# Patient Record
Sex: MALE | Race: BLACK OR AFRICAN AMERICAN | NOT HISPANIC OR LATINO | Employment: FULL TIME | URBAN - METROPOLITAN AREA
[De-identification: names, ages, dates, MRNs, and addresses within clinical notes are randomized per-mention and may not be internally consistent; named-entity substitution may affect disease eponyms.]

---

## 2017-01-03 ENCOUNTER — MYC MEDICAL ADVICE (OUTPATIENT)
Dept: RHEUMATOLOGY | Facility: CLINIC | Age: 27
End: 2017-01-03

## 2017-01-03 ENCOUNTER — TELEPHONE (OUTPATIENT)
Dept: RHEUMATOLOGY | Facility: CLINIC | Age: 27
End: 2017-01-03

## 2017-01-03 NOTE — TELEPHONE ENCOUNTER
Patient calling. He was seen last week. He took the weaker of the medications offered. He would like to try the stronger one. Please call and let know.

## 2017-01-03 NOTE — TELEPHONE ENCOUNTER
If he is having an attack ---none of the meds work right away---probenecid nor allopurinol.  For that, he would need indocin or prednisone.  Otherwise, we can switch to allopurinol 300 mg daily

## 2017-01-03 NOTE — TELEPHONE ENCOUNTER
Tried to call pt. And there was no answer or voice mail.  Will mychart a message as well.      Marla LADD LPN

## 2017-02-21 NOTE — TELEPHONE ENCOUNTER
Reason for call: Patient is calling back regarding missed calls and trying different medication.    Phone Number Pt can be reached at: Home number on file [phone] (home)  Best Time: AVAILABLE AFTER 6 PM.  Can we leave a detailed message on this number? YES  - HE IS WORKING ON SETTING UP VM

## 2017-07-05 ENCOUNTER — OFFICE VISIT (OUTPATIENT)
Dept: FAMILY MEDICINE | Facility: CLINIC | Age: 27
End: 2017-07-05
Payer: COMMERCIAL

## 2017-07-05 VITALS
BODY MASS INDEX: 35.49 KG/M2 | HEIGHT: 72 IN | HEART RATE: 65 BPM | TEMPERATURE: 98.4 F | SYSTOLIC BLOOD PRESSURE: 138 MMHG | WEIGHT: 262 LBS | DIASTOLIC BLOOD PRESSURE: 86 MMHG

## 2017-07-05 DIAGNOSIS — R09.81 NASAL CONGESTION: ICD-10-CM

## 2017-07-05 DIAGNOSIS — F41.9 ANXIETY AND DEPRESSION: Primary | ICD-10-CM

## 2017-07-05 DIAGNOSIS — F32.A ANXIETY AND DEPRESSION: Primary | ICD-10-CM

## 2017-07-05 LAB — TSH SERPL DL<=0.005 MIU/L-ACNC: 1.29 MU/L (ref 0.4–4)

## 2017-07-05 PROCEDURE — 99214 OFFICE O/P EST MOD 30 MIN: CPT | Performed by: FAMILY MEDICINE

## 2017-07-05 PROCEDURE — 84443 ASSAY THYROID STIM HORMONE: CPT | Performed by: FAMILY MEDICINE

## 2017-07-05 PROCEDURE — 36415 COLL VENOUS BLD VENIPUNCTURE: CPT | Performed by: FAMILY MEDICINE

## 2017-07-05 RX ORDER — OXYMETAZOLINE HYDROCHLORIDE 0.05 G/100ML
2 SPRAY NASAL 2 TIMES DAILY
Qty: 1.2 ML | Refills: 0 | Status: SHIPPED | OUTPATIENT
Start: 2017-07-05 | End: 2017-07-08

## 2017-07-05 ASSESSMENT — ANXIETY QUESTIONNAIRES
5. BEING SO RESTLESS THAT IT IS HARD TO SIT STILL: NEARLY EVERY DAY
3. WORRYING TOO MUCH ABOUT DIFFERENT THINGS: NEARLY EVERY DAY
1. FEELING NERVOUS, ANXIOUS, OR ON EDGE: NEARLY EVERY DAY
7. FEELING AFRAID AS IF SOMETHING AWFUL MIGHT HAPPEN: NEARLY EVERY DAY
2. NOT BEING ABLE TO STOP OR CONTROL WORRYING: NEARLY EVERY DAY
6. BECOMING EASILY ANNOYED OR IRRITABLE: NEARLY EVERY DAY
GAD7 TOTAL SCORE: 21

## 2017-07-05 ASSESSMENT — PATIENT HEALTH QUESTIONNAIRE - PHQ9: 5. POOR APPETITE OR OVEREATING: NEARLY EVERY DAY

## 2017-07-05 NOTE — NURSING NOTE
Chief Complaint   Patient presents with     Sinus Problem     runny and stuffy nose x 2 days      Depression     and anxiety        Initial /86  Pulse 65  Temp 98.4  F (36.9  C) (Oral)  Ht 6' (1.829 m)  Wt 262 lb (118.8 kg)  BMI 35.53 kg/m2 Estimated body mass index is 35.53 kg/(m^2) as calculated from the following:    Height as of this encounter: 6' (1.829 m).    Weight as of this encounter: 262 lb (118.8 kg).  Medication Reconciliation: complete  Cecilia Barber MA

## 2017-07-05 NOTE — LETTER
[address] [address]  Phone: [phone]  Fax: [phone]    July 5, 2017        Vitor Oseguera  [address] [facility] [address]          To whom it may concern:    RE: Vitor Oseguera    Patient was seen and treated today at our clinic.    Please contact me for questions or concerns.      Sincerely,        Veronica Pederson MD

## 2017-07-05 NOTE — PROGRESS NOTES
SUBJECTIVE:                                                    Vitor Oseguera is a 27 year old male who presents to clinic today for the following health issues:    ENT Symptoms             Symptoms: cc Present Absent Comment   Fever/Chills  x  Feels warm to touch.    Fatigue  x     Muscle Aches  x  Neck and shoulders    Eye Irritation   x    Sneezing  x  Feels like he has seasonal allergies. This is new this year.    Nasal Nicolas/Drg  x     Sinus Pressure/Pain  x     Loss of smell   x    Dental pain   x    Sore Throat   x    Swollen Glands   x    Ear Pain/Fullness  x  R ear    Cough  x     Wheeze  x     Chest Pain   x    Shortness of breath  x     Rash   x    Other         Symptom duration:  x 2 days    Symptom severity:  moderate   Treatments tried:  mucinex    Contacts:  none       Abnormal Mood Symptoms  Onset: 3-5 year ago     Description:   Depression: YES  Anxiety: YES    Accompanying Signs & Symptoms:  Still participating in activities that you used to enjoy: no  Fatigue: YES  Irritability: YES  Difficulty concentrating: YES  Changes in appetite: YES  Problems with sleep: YES  Heart racing/beating fast : YES  Thoughts of hurting yourself or others: none    History:   Recent stress: YES  Prior depression hospitalization: None  Family history of depression: YES  Family history of anxiety: YES    Precipitating factors:   Alcohol/drug use: no     Alleviating factors:  None   Therapies Tried and outcome: None    His symptoms are getting worse recently. It is hard to handle. Starting to affect work and relationship. No Suicidal or homicidal thoughts or ideations.   Has support from family and friends.   Nonsmoker.   Occasional alcohol drinking.   No other drug use.       Problem list and histories reviewed & adjusted, as indicated.  Additional history: as documented    Patient Active Problem List   Diagnosis     CARDIOVASCULAR SCREENING; LDL GOAL LESS THAN 160     Past Surgical History:   Procedure Laterality Date      NO HISTORY OF SURGERY         Social History   Substance Use Topics     Smoking status: Former Smoker     Smokeless tobacco: Never Used     Alcohol use No      Comment: rare     Family History   Problem Relation Age of Onset     Hypertension Mother      DIABETES Father      Hypertension Father          BP Readings from Last 3 Encounters:   07/05/17 138/86   12/20/16 147/85   12/07/16 124/70    Wt Readings from Last 3 Encounters:   07/05/17 262 lb (118.8 kg)   12/20/16 246 lb (111.6 kg)   12/07/16 260 lb (117.9 kg)                    Reviewed and updated as needed this visit by clinical staff  Tobacco  Allergies  Meds  Med Hx  Surg Hx  Fam Hx  Soc Hx      Reviewed and updated as needed this visit by Provider         ROS:  Constitutional, HEENT, cardiovascular, pulmonary, gi and gu systems are negative, except as otherwise noted.    OBJECTIVE:     /86  Pulse 65  Temp 98.4  F (36.9  C) (Oral)  Ht 6' (1.829 m)  Wt 262 lb (118.8 kg)  BMI 35.53 kg/m2  Body mass index is 35.53 kg/(m^2).  GENERAL: healthy, alert and no distress  HENT: ear canals and TM's normal, nose: nasal congestion bilateral nostrils and mouth without ulcers or lesions  NECK: no adenopathy, no asymmetry, masses, or scars and thyroid normal to palpation  SINUSES: nontender.   RESP: lungs clear to auscultation - no rales, rhonchi or wheezes  CV: regular rate and rhythm  MS: no gross musculoskeletal defects noted, no edema  PSYCH: mentation appears normal, affect normal/bright    ASSESSMENT/PLAN:       ICD-10-CM    1. Anxiety and depression F41.9 sertraline (ZOLOFT) 50 MG tablet    F32.9 TSH with free T4 reflex     MENTAL HEALTH REFERRAL   2. Nasal congestion R09.81 oxymetazoline (AFRIN NASAL SPRAY) 0.05 % spray     Pt aware that it takes 4-6 weeks for the medication to be effective.   Symptoms may get worse at the beginning.   Psychotherapy referral.   F/u in 6 weeks.     URI symptoms: afrin nasal spray, OTC medications for pain and  fever. OTC meds for allergies as needed. F/u if not better by a week.       Veronica Pederson MD  [facility]

## 2017-07-05 NOTE — MR AVS SNAPSHOT
After Visit Summary   7/5/2017    Vitor Oseguera    MRN: [id number]           Patient Information     Date Of Birth          1990        Visit Information        Provider Department      7/5/2017 2:00 PM Veronica Pederson MD [facility]        Today's Diagnoses     Anxiety and depression    -  1    Nasal congestion          Care Instructions      Preventive Health Recommendations  Male Ages 18 - 25     Yearly exam:             See your health care provider every year in order to  o   Review health changes.   o   Discuss preventive care.    o   Review your medicines if your doctor has prescribed any.    You should be tested each year for STDs (sexually transmitted diseases).     Talk to your provider about cholesterol testing.      If you are at risk for diabetes, you should have a diabetes test (fasting glucose).    Shots: Get a flu shot each year. Get a tetanus shot every 10 years.     Nutrition:    Eat at least 5 servings of fruits and vegetables daily.     Eat whole-grain bread, whole-wheat pasta and brown rice instead of white grains and rice.     Talk to your provider about calcium and Vitamin D.     Lifestyle    Exercise for at least 150 minutes a week (30 minutes a day, 5 days a week). This will help you control your weight and prevent disease.     Limit alcohol to one drink per day.     No smoking.     Wear sunscreen to prevent skin cancer.     See your dentist every six months for an exam and cleaning.             Follow-ups after your visit        Additional Services     MENTAL HEALTH REFERRAL       Your provider has referred you to: [facility]: [facility] - Counseling (Individual/Couples/Family) - [facility] [phone]   [email or web address]   *Patient will be contacted by [facility] scheduling partner[facility]), to schedule an appointment.   Patients may also call [facility] to schedule.    All scheduling is subject to the client's specific insurance plan & benefits, provider/location availability, and provider clinical specialities.  Please arrive 15 minutes early for your first appointment and bring your completed paperwork.    Please be aware that coverage of these services is subject to the terms and limitations of your health insurance plan.  Call member services at your health plan with any benefit or coverage questions.                  Who to contact     If you have questions or need follow up information about today's clinic visit or your schedule please contact [facility] directly at [phone].  Normal or non-critical lab and imaging results will be communicated to you by Dazohart, letter or phone within 4 business days after the clinic has received the results. If you do not hear from us within 7 days, please contact the clinic through Blipifyt or phone. If you have a critical or abnormal lab result, we will notify you by phone as soon as possible.  Submit refill requests through Lifesum or call your pharmacy and they will forward the refill request to us. Please allow 3 business days for your refill to be completed.          Additional Information About Your Visit        MyChart Information     Lifesum gives you secure access to your electronic health record. If you see a primary care provider, you can also send messages to your care team and make appointments. If you have questions, please call your primary care clinic.  If you do not have a primary care provider, please call [phone] and they will assist you.        Care EveryWhere ID     This is your Care EveryWhere ID. This could be used by other organizations to access your [facility] medical records  [id number]        Your Vitals Were     Pulse Temperature Height BMI (Body Mass Index)          65 98.4  F (36.9  C) (Oral) 6' (1.829 m) 35.53 kg/m2         Blood  Pressure from Last 3 Encounters:   07/05/17 138/86   12/20/16 147/85   12/07/16 124/70    Weight from Last 3 Encounters:   07/05/17 262 lb (118.8 kg)   12/20/16 246 lb (111.6 kg)   12/07/16 260 lb (117.9 kg)              We Performed the Following     MENTAL HEALTH REFERRAL     TSH with free T4 reflex          Today's Medication Changes          These changes are accurate as of: 7/5/17  2:52 PM.  If you have any questions, ask your nurse or doctor.               Start taking these medicines.        Dose/Directions    oxymetazoline 0.05 % spray   Commonly known as:  AFRIN NASAL SPRAY   Used for:  Nasal congestion   Started by:  Veronica Pederson MD        Dose:  2 spray   Spray 2 sprays into both nostrils 2 times daily for 3 days   Quantity:  1.2 mL   Refills:  0       sertraline 50 MG tablet   Commonly known as:  ZOLOFT   Used for:  Anxiety and depression   Started by:  Veronica Pederson MD        Take 1/2 tablet (25 mg) for 2 weeks, then increase to 1 tablet orally daily   Quantity:  30 tablet   Refills:  1            Where to get your medicines      These medications were sent to [facility] [address]  [address] [facility] [address]     Phone:  [phone]     oxymetazoline 0.05 % spray    sertraline 50 MG tablet                Primary Care Provider Office Phone # Fax #    Jaymie Blount PA-C [phone]       [facility] [address] [facility] [address]        Equal Access to Services     JOSUE [facility] : Hadii edson ku hadasho Soomaali, waaxda luqadaha, qaybta kaalmada adeegally, huber idiin hayaan adeeg kharash la'aan . So [facility] [phone].    ATENCIÓN: Si habla español, tiene a schafer disposición servicios gratuitos de asistencia lingüística. Llame al [phone].    We comply with applicable federal civil rights laws and [place] laws. We do not discriminate on the basis of race, color,  national origin, age, disability sex, sexual orientation or gender identity.            Thank you!     Thank you for choosing [facility]  for your care. Our goal is always to provide you with excellent care. Hearing back from our patients is one way we can continue to improve our services. Please take a few minutes to complete the written survey that you may receive in the mail after your visit with us. Thank you!             Your Updated Medication List - Protect others around you: Learn how to safely use, store and throw away your medicines at www.disposemymeds.org.          This list is accurate as of: 7/5/17  2:52 PM.  Always use your most recent med list.                   Brand Name Dispense Instructions for use Diagnosis    oxymetazoline 0.05 % spray    AFRIN NASAL SPRAY    1.2 mL    Spray 2 sprays into both nostrils 2 times daily for 3 days    Nasal congestion       sertraline 50 MG tablet    ZOLOFT    30 tablet    Take 1/2 tablet (25 mg) for 2 weeks, then increase to 1 tablet orally daily    Anxiety and depression

## 2017-07-06 ASSESSMENT — PATIENT HEALTH QUESTIONNAIRE - PHQ9: SUM OF ALL RESPONSES TO PHQ QUESTIONS 1-9: 24

## 2017-07-06 ASSESSMENT — ANXIETY QUESTIONNAIRES: GAD7 TOTAL SCORE: 21

## 2017-07-06 NOTE — PROGRESS NOTES
Dear Vitor Oseguera,     Your recent TSH ( thyroid test ) is normal.     Veronica Pederson MD.   Family Physician.  [facility].

## 2017-08-15 PROBLEM — F32.A ANXIETY AND DEPRESSION: Status: ACTIVE | Noted: 2017-08-15

## 2017-08-15 PROBLEM — F41.9 ANXIETY AND DEPRESSION: Status: ACTIVE | Noted: 2017-08-15

## 2017-10-17 ENCOUNTER — TELEPHONE (OUTPATIENT)
Dept: FAMILY MEDICINE | Facility: CLINIC | Age: 27
End: 2017-10-17

## 2017-10-17 DIAGNOSIS — F41.9 ANXIETY AND DEPRESSION: ICD-10-CM

## 2017-10-17 DIAGNOSIS — F32.A ANXIETY AND DEPRESSION: ICD-10-CM

## 2017-10-17 NOTE — TELEPHONE ENCOUNTER
Routing refill request to provider for review/approval because:  Elevated PHQ-9 score > 5      Clair Hernández RN  [facility]

## 2017-10-17 NOTE — TELEPHONE ENCOUNTER
sertraline (ZOLOFT) 50 MG tablet     Last Written Prescription Date: 7/5/17  Last Fill Quantity: 30, # refills: 1  Last Office Visit with G primary care provider:  7/5/17        Last PHQ-9 score on record=   PHQ-9 SCORE 7/5/2017   Total Score 24

## 2017-10-17 NOTE — LETTER
Vitor,    We refilled your Sertraline for you.  Please call us at [phone] so we may update your depression screening, and see how you are doing on this medication.    You may also make an office visit with Dr. Pederson for a follow up.    Jeanie Urbano RN [facility] Triage.

## 2017-10-19 NOTE — TELEPHONE ENCOUNTER
PHQ-9 score:    PHQ-9 SCORE 7/5/2017   Total Score 24       Patient last seen 7/5/17, see plan:    Pt aware that it takes 4-6 weeks for the medication to be effective.   Symptoms may get worse at the beginning.   Psychotherapy referral.   F/u in 6 weeks.         Attempted to call patient at home number [phone], left message on answering service requesting call back to clinic to discuss.  Rachelle Hernandez RN  [facility]

## 2017-10-20 NOTE — TELEPHONE ENCOUNTER
Called patient at [phone] (home). Left message on voicemail to return phone call to triage.  Jeanie Urbano RN [facility] Triage.

## 2017-10-24 NOTE — TELEPHONE ENCOUNTER
Patient has not returned phone call.  Mailed letter to patient with the information below per provider.  See letter.  Jeanie Urbano RN [facility] Triage.

## 2018-02-19 ENCOUNTER — TELEPHONE (OUTPATIENT)
Dept: FAMILY MEDICINE | Facility: CLINIC | Age: 28
End: 2018-02-19

## 2018-02-19 DIAGNOSIS — F32.A ANXIETY AND DEPRESSION: ICD-10-CM

## 2018-02-19 DIAGNOSIS — F41.9 ANXIETY AND DEPRESSION: ICD-10-CM

## 2018-02-19 NOTE — TELEPHONE ENCOUNTER
"Requested Prescriptions   Pending Prescriptions Disp Refills     sertraline (ZOLOFT) 50 MG tablet [Pharmacy Med Name: SERTRALINE HCL 50MG TABS] 90 tablet 0    Last Written Prescription Date:  10-18-17  Last Fill Quantity: 90,  # refills: 0   Last office visit: 7/5/2017 with prescribing provider:     Future Office Visit:     Sig: TAKE ONE TABLET BY MOUTH EVERY DAY    SSRIs Protocol Passed    2/19/2018  1:17 PM       Passed - Recent or future visit with authorizing provider    Patient had office visit in the last year or has a visit in the next 30 days with authorizing provider.  See \"Patient Info\" tab in inbasket, or \"Choose Columns\" in Meds & Orders section of the refill encounter.            Passed - Patient is age 18 or older          "

## 2018-02-19 NOTE — TELEPHONE ENCOUNTER
30 pills approved. Get PHQ 9 done over phone or   Pt needs to be seen before next refill.     Veronica Pederson MD.   Family Physician.  [facility].

## 2018-02-19 NOTE — TELEPHONE ENCOUNTER
PHQ-9 score:    PHQ-9 SCORE 7/5/2017   Total Score 24     Routing refill request to provider for review/approval because:  PHQ 9 = 24    Jeanie Urbano, COLLIN [facility] Triage.

## 2018-03-08 NOTE — TELEPHONE ENCOUNTER
Left Voicemail for patient to call back to inform him before next refill that he is due for appointment.  Jimena Villela, CMA

## 2018-03-26 DIAGNOSIS — F32.A ANXIETY AND DEPRESSION: ICD-10-CM

## 2018-03-26 DIAGNOSIS — F41.9 ANXIETY AND DEPRESSION: ICD-10-CM

## 2018-03-26 NOTE — TELEPHONE ENCOUNTER
"Requested Prescriptions   Pending Prescriptions Disp Refills     sertraline (ZOLOFT) 50 MG tablet [Pharmacy Med Name: SERTRALINE HCL 50MG TABS] 30 tablet 0    Last Written Prescription Date:  2-19-18  Last Fill Quantity: 30,  # refills: 0   Last office visit: 7/5/2017 with prescribing provider:     Future Office Visit:     Sig: TAKE ONE TABLET BY MOUTH EVERY DAY    SSRIs Protocol Passed    3/26/2018  3:08 PM       Passed - Recent (12 mo) or future (30 days) visit within the authorizing provider's specialty    Patient had office visit in the last 12 months or has a visit in the next 30 days with authorizing provider or within the authorizing provider's specialty.  See \"Patient Info\" tab in inbasket, or \"Choose Columns\" in Meds & Orders section of the refill encounter.           Passed - Patient is age 18 or older          "

## 2018-03-28 NOTE — TELEPHONE ENCOUNTER
One adelnie already given back on 2/19/18.  Left another  for patient to call and schedule a follow-up appointment.      Courtney Reyez RN

## 2018-03-28 NOTE — TELEPHONE ENCOUNTER
Tried to contact patient to inform him before his next refill that he is due for an appointment.  Have left patient 3 voice mails.    Jimena Villela, JU

## 2019-10-01 ENCOUNTER — HEALTH MAINTENANCE LETTER (OUTPATIENT)
Age: 29
End: 2019-10-01

## 2021-01-15 ENCOUNTER — HEALTH MAINTENANCE LETTER (OUTPATIENT)
Age: 31
End: 2021-01-15

## 2021-01-25 ASSESSMENT — ENCOUNTER SYMPTOMS
HEMATOCHEZIA: 0
ARTHRALGIAS: 1
DIZZINESS: 0
JOINT SWELLING: 0
CONSTIPATION: 0
SHORTNESS OF BREATH: 0
DYSURIA: 0
NERVOUS/ANXIOUS: 1
PALPITATIONS: 0
FREQUENCY: 0
WEAKNESS: 0
ABDOMINAL PAIN: 0
FEVER: 0
NAUSEA: 0
CHILLS: 0
HEADACHES: 0
PARESTHESIAS: 0
HEMATURIA: 0
COUGH: 0
MYALGIAS: 0
HEARTBURN: 0
SORE THROAT: 0
EYE PAIN: 0
DIARRHEA: 0

## 2021-01-26 ENCOUNTER — OFFICE VISIT (OUTPATIENT)
Dept: FAMILY MEDICINE | Facility: CLINIC | Age: 31
End: 2021-01-26
Payer: MEDICAID

## 2021-01-26 VITALS
WEIGHT: 263 LBS | TEMPERATURE: 98.4 F | DIASTOLIC BLOOD PRESSURE: 82 MMHG | HEART RATE: 87 BPM | OXYGEN SATURATION: 96 % | HEIGHT: 73 IN | SYSTOLIC BLOOD PRESSURE: 130 MMHG | BODY MASS INDEX: 34.85 KG/M2

## 2021-01-26 DIAGNOSIS — E66.09 CLASS 1 OBESITY DUE TO EXCESS CALORIES WITHOUT SERIOUS COMORBIDITY WITH BODY MASS INDEX (BMI) OF 34.0 TO 34.9 IN ADULT: ICD-10-CM

## 2021-01-26 DIAGNOSIS — F32.A ANXIETY AND DEPRESSION: ICD-10-CM

## 2021-01-26 DIAGNOSIS — E66.811 CLASS 1 OBESITY DUE TO EXCESS CALORIES WITHOUT SERIOUS COMORBIDITY WITH BODY MASS INDEX (BMI) OF 34.0 TO 34.9 IN ADULT: ICD-10-CM

## 2021-01-26 DIAGNOSIS — F41.9 ANXIETY AND DEPRESSION: ICD-10-CM

## 2021-01-26 DIAGNOSIS — Z00.00 ROUTINE GENERAL MEDICAL EXAMINATION AT A HEALTH CARE FACILITY: Primary | ICD-10-CM

## 2021-01-26 PROCEDURE — 99395 PREV VISIT EST AGE 18-39: CPT | Performed by: PHYSICIAN ASSISTANT

## 2021-01-26 ASSESSMENT — ENCOUNTER SYMPTOMS
NAUSEA: 0
MYALGIAS: 0
FREQUENCY: 0
DYSURIA: 0
CHILLS: 0
EYE PAIN: 0
DIZZINESS: 0
ARTHRALGIAS: 1
WEAKNESS: 0
HEADACHES: 0
JOINT SWELLING: 0
ABDOMINAL PAIN: 0
SHORTNESS OF BREATH: 0
PARESTHESIAS: 0
HEMATURIA: 0
COUGH: 0
HEMATOCHEZIA: 0
HEARTBURN: 0
SORE THROAT: 0
NERVOUS/ANXIOUS: 1
PALPITATIONS: 0
FEVER: 0
DIARRHEA: 0
CONSTIPATION: 0

## 2021-01-26 ASSESSMENT — MIFFLIN-ST. JEOR: SCORE: 2215.45

## 2021-01-26 ASSESSMENT — PATIENT HEALTH QUESTIONNAIRE - PHQ9: SUM OF ALL RESPONSES TO PHQ QUESTIONS 1-9: 10

## 2021-01-26 NOTE — PROGRESS NOTES
SUBJECTIVE:   CC: Vitor Oseguera is an 30 year old male who presents for preventative health visit.       Patient has been advised of split billing requirements and indicates understanding: Yes  Healthy Habits:     Getting at least 3 servings of Calcium per day:  Yes    Bi-annual eye exam:  NO    Dental care twice a year:  NO    Sleep apnea or symptoms of sleep apnea:  None    Diet:  Regular (no restrictions)    Frequency of exercise:  1 day/week    Duration of exercise:  15-30 minutes    Taking medications regularly:  Yes    PHQ-2 Total Score: 2    Additional concerns today:  Yes      Has gained weight recently.   Family history of HTN  Declines STD testing.   Has been a rough week or 2. Overall feeling like the zoloft 50mg is working well.   He does not know who is prescribing this for him, but he recently had this refilled.     He notes his paternal uncle and cousin both have the same type of cancer, uncle is . He does not know the name of the cancer or the type.           Today's PHQ-2 Score:   PHQ-2 (  [employer]) 2021   Q1: Little interest or pleasure in doing things 1   Q2: Feeling down, depressed or hopeless 1   PHQ-2 Score 2   Q1: Little interest or pleasure in doing things Several days   Q2: Feeling down, depressed or hopeless Several days   PHQ-2 Score 2       Abuse: Current or Past(Physical, Sexual or Emotional)- No  Do you feel safe in your environment? Yes        Social History     Tobacco Use     Smoking status: Former Smoker     Packs/day: 0.00     Years: 5.00     Pack years: 0.00     Smokeless tobacco: Never Used     Tobacco comment: Smoked from 1823-2057 on and off   Substance Use Topics     Alcohol use: No     Comment: rare     If you drink alcohol do you typically have >3 drinks per day or >7 drinks per week? No    Alcohol Use 2021   Prescreen: >3 drinks/day or >7 drinks/week? -   Prescreen: >3 drinks/day or >7 drinks/week? No       Last PSA: No results found for:  "PSA    Reviewed orders with patient. Reviewed health maintenance and updated orders accordingly - Yes    Reviewed and updated as needed this visit by clinical staff  Tobacco  Allergies  Meds  Problems  Med Hx  Surg Hx  Fam Hx          Reviewed and updated as needed this visit by Provider  Tobacco  Allergies  Meds  Problems  Med Hx  Surg Hx  Fam Hx             Review of Systems   Constitutional: Negative for chills and fever.   HENT: Negative for congestion, ear pain, hearing loss and sore throat.    Eyes: Negative for pain and visual disturbance.   Respiratory: Negative for cough and shortness of breath.    Cardiovascular: Negative for chest pain, palpitations and peripheral edema.   Gastrointestinal: Negative for abdominal pain, constipation, diarrhea, heartburn, hematochezia and nausea.   Genitourinary: Negative for discharge, dysuria, frequency, genital sores, hematuria, impotence and urgency.   Musculoskeletal: Positive for arthralgias. Negative for joint swelling and myalgias.   Skin: Negative for rash.   Neurological: Negative for dizziness, weakness, headaches and paresthesias.   Psychiatric/Behavioral: Positive for mood changes. The patient is nervous/anxious.        OBJECTIVE:   BP (!) 144/95 (BP Location: Left arm, Patient Position: Chair, Cuff Size: Adult Large)   Pulse 87   Temp 98.4  F (36.9  C) (Oral)   Ht 1.852 m (6' 0.91\")   Wt 119.3 kg (263 lb)   SpO2 96%   BMI 34.78 kg/m      Physical Exam  GENERAL: healthy, alert and no distress  EYES: Eyes grossly normal to inspection, PERRL and conjunctivae and sclerae normal  HENT: ear canals and TM's normal, nose and mouth without ulcers or lesions  NECK: no adenopathy, no asymmetry, masses, or scars and thyroid normal to palpation  RESP: lungs clear to auscultation - no rales, rhonchi or wheezes  CV: regular rate and rhythm, normal S1 S2, no S3 or S4, no murmur, click or rub, no peripheral edema and peripheral pulses strong  ABDOMEN: soft, " "nontender, no hepatosplenomegaly, no masses and bowel sounds normal  MS: no gross musculoskeletal defects noted, no edema  SKIN: no suspicious lesions or rashes  NEURO: Normal strength and tone, mentation intact and speech normal  PSYCH: mentation appears normal, affect normal/bright    Diagnostic Test Results:  Labs reviewed in Epic    ASSESSMENT/PLAN:   1. Routine general medical examination at a health care facility    2. Anxiety and depression  Managed elsewhere.     3. Class 1 obesity due to excess calories without serious comorbidity with body mass index (BMI) of 34.0 to 34.9 in adult  Encouraged increased exercise and weigh tloss.       Patient has been advised of split billing requirements and indicates understanding: Yes  COUNSELING:   Reviewed preventive health counseling, as reflected in patient instructions       Regular exercise       Healthy diet/nutrition       Safe sex practices/STD prevention    Estimated body mass index is 34.78 kg/m  as calculated from the following:    Height as of this encounter: 1.852 m (6' 0.91\").    Weight as of this encounter: 119.3 kg (263 lb).     Weight management plan: Discussed healthy diet and exercise guidelines    He reports that he has quit smoking. He smoked 0.00 packs per day for 5.00 years. He has never used smokeless tobacco.      Counseling Resources:  ATP IV Guidelines  Pooled Cohorts Equation Calculator  FRAX Risk Assessment  ICSI Preventive Guidelines  Dietary Guidelines for Americans, 2010  USDA's MyPlate  ASA Prophylaxis  Lung CA Screening    DIONY Webber [facility]  "

## 2021-01-26 NOTE — LETTER
My Depression Action Plan  Name: Vitor Oseguera   Date of Birth 1990  Date: 1/26/2021    My doctor: Jaymie Blount   My clinic: [address]  [place] [address]  [phone]          GREEN    ZONE   Good Control    What it looks like:     Things are going generally well. You have normal ups and downs. You may even feel depressed from time to time, but bad moods usually last less than a day.   What you need to do:  1. Continue to care for yourself (see self care plan)  2. Check your depression survival kit and update it as needed  3. Follow your physician s recommendations including any medication.  4. Do not stop taking medication unless you consult with your physician first.           YELLOW         ZONE Getting Worse    What it looks like:     Depression is starting to interfere with your life.     It may be hard to get out of bed; you may be starting to isolate yourself from others.    Symptoms of depression are starting to last most all day and this has happened for several days.     You may have suicidal thoughts but they are not constant.   What you need to do:     1. Call your care team. Your response to treatment will improve if you keep your care team informed of your progress. Yellow periods are signs an adjustment may need to be made.     2. Continue your self-care.  Just get dressed and ready for the day.  Don't give yourself time to talk yourself out of it.    3. Talk to someone in your support network.    4. Open up your Depression Self-Care Plan/Wellness Kit.           RED    ZONE Medical Alert - Get Help    What it looks like:     Depression is seriously interfering with your life.     You may experience these or other symptoms: You can t get out of bed most days, can t work or engage in other necessary activities, you have trouble taking care of basic hygiene, or basic responsibilities, thoughts of suicide or death that will not go  away, self-injurious behavior.     What you need to do:  1. Call your care team and request a same-day appointment. If they are not available (weekends or after hours) call your local crisis line, emergency room or 911.          Depression Self-Care Plan / Wellness Kit    Many people find that medication and therapy are helpful treatments for managing depression. In addition, making small changes to your everyday life can help to boost your mood and improve your wellbeing. Below are some tips for you to consider. Be sure to talk with your medical provider and/or behavioral health consultant if your symptoms are worsening or not improving.     Sleep   Sleep hygiene  means all of the habits that support good, restful sleep. It includes maintaining a consistent bedtime and wake time, using your bedroom only for sleeping or sex, and keeping the bedroom dark and free of distractions like a computer, smartphone, or television.     Develop a Healthy Routine  Maintain good hygiene. Get out of bed in the morning, make your bed, brush your teeth, take a shower, and get dressed. Don t spend too much time viewing media that makes you feel stressed. Find time to relax each day.    Exercise  Get some form of exercise every day. This will help reduce pain and release endorphins, the  feel good  chemicals in your brain. It can be as simple as just going for a walk or doing some gardening, anything that will get you moving.      Diet  Strive to eat healthy foods, including fruits and vegetables. Drink plenty of water. Avoid excessive sugar, caffeine, alcohol, and other mood-altering substances.     Stay Connected with Others  Stay in touch with friends and family members.    Manage Your Mood  Try deep breathing, massage therapy, biofeedback, or meditation. Take part in fun activities when you can. Try to find something to smile about each day.     Psychotherapy  Be open to working with a therapist if your provider recommends it.      Medication  Be sure to take your medication as prescribed. Most anti-depressants need to be taken every day. It usually takes several weeks for medications to work. Not all medicines work for all people. It is important to follow-up with your provider to make sure you have a treatment plan that is working for you. Do not stop your medication abruptly without first discussing it with your provider.    Crisis Resources   These hotlines are for both adults and children. They and are open 24 hours a day, 7 days a week unless noted otherwise.      National Suicide Prevention Lifeline   6-087-991-TALK (6448)      Crisis Text Line    www.crisistextline.org  Text HOME to 661389 from anywhere in the [place], anytime, about any type of crisis. A live, trained crisis counselor will receive the text and respond quickly.      [facility] Lifeline for LGBTQ Youth  A national crisis intervention and suicide lifeline for LGBTQ youth under 25. Provides a safe place to talk without judgement. Call 1-899.876.5164; text START to 899213 or visit www.thetrevorproject.org to talk to a trained counselor.      For [facility] crisis numbers, visit the [facility] website at:  https://[place].gov/dhs/people-we-serve/adults/health-care/mental-health/resources/crisis-contacts.jsp

## 2021-03-17 ENCOUNTER — MYC REFILL (OUTPATIENT)
Dept: FAMILY MEDICINE | Facility: CLINIC | Age: 31
End: 2021-03-17

## 2021-03-17 DIAGNOSIS — F32.A ANXIETY AND DEPRESSION: ICD-10-CM

## 2021-03-17 DIAGNOSIS — F41.9 ANXIETY AND DEPRESSION: ICD-10-CM

## 2021-03-20 NOTE — TELEPHONE ENCOUNTER
Routing refill request to provider for review/approval because:  Unknown prescriber.  Lissette Harvey RN

## 2021-08-19 ENCOUNTER — MYC REFILL (OUTPATIENT)
Dept: FAMILY MEDICINE | Facility: CLINIC | Age: 31
End: 2021-08-19

## 2021-08-19 DIAGNOSIS — F32.A ANXIETY AND DEPRESSION: ICD-10-CM

## 2021-08-19 DIAGNOSIS — F41.9 ANXIETY AND DEPRESSION: ICD-10-CM

## 2021-08-19 ASSESSMENT — ANXIETY QUESTIONNAIRES
8. IF YOU CHECKED OFF ANY PROBLEMS, HOW DIFFICULT HAVE THESE MADE IT FOR YOU TO DO YOUR WORK, TAKE CARE OF THINGS AT HOME, OR GET ALONG WITH OTHER PEOPLE?: VERY DIFFICULT
6. BECOMING EASILY ANNOYED OR IRRITABLE: MORE THAN HALF THE DAYS
2. NOT BEING ABLE TO STOP OR CONTROL WORRYING: MORE THAN HALF THE DAYS
3. WORRYING TOO MUCH ABOUT DIFFERENT THINGS: MORE THAN HALF THE DAYS
GAD7 TOTAL SCORE: 14
GAD7 TOTAL SCORE: 14
7. FEELING AFRAID AS IF SOMETHING AWFUL MIGHT HAPPEN: MORE THAN HALF THE DAYS
GAD7 TOTAL SCORE: 14
1. FEELING NERVOUS, ANXIOUS, OR ON EDGE: MORE THAN HALF THE DAYS
5. BEING SO RESTLESS THAT IT IS HARD TO SIT STILL: MORE THAN HALF THE DAYS
4. TROUBLE RELAXING: MORE THAN HALF THE DAYS
7. FEELING AFRAID AS IF SOMETHING AWFUL MIGHT HAPPEN: MORE THAN HALF THE DAYS

## 2021-08-20 ASSESSMENT — ANXIETY QUESTIONNAIRES: GAD7 TOTAL SCORE: 14

## 2021-08-23 DIAGNOSIS — F41.9 ANXIETY AND DEPRESSION: ICD-10-CM

## 2021-08-23 DIAGNOSIS — F32.A ANXIETY AND DEPRESSION: ICD-10-CM

## 2021-08-24 NOTE — TELEPHONE ENCOUNTER
"Requested Prescriptions   Pending Prescriptions Disp Refills     sertraline (ZOLOFT) 50 MG tablet [Pharmacy Med Name: SERTRALINE 50MG TABLETS] 30 tablet 0     Sig: TAKE 1 TABLET(50 MG) BY MOUTH DAILY       SSRIs Protocol Failed - 8/23/2021  8:08 AM        Failed - PHQ-9 score less than 5 in past 6 months     Please review last PHQ-9 score.           Failed - Recent (6 mo) or future (30 days) visit within the authorizing provider's specialty     Patient had office visit in the last 6 months or has a visit in the next 30 days with authorizing provider or within the authorizing provider's specialty.  See \"Patient Info\" tab in inbasket, or \"Choose Columns\" in Meds & Orders section of the refill encounter.            Passed - Medication is active on med list        Passed - Patient is age 18 or older           Left detailed message on patient's identified voice mail.  Advised patient to call [phone] at his earliest convenience and schedule the follow-up appointment.    Team:  Please call patient and complete the PHQ-9  "

## 2021-08-25 NOTE — TELEPHONE ENCOUNTER
"[employer] message sent to pt to schedule follow-up appointment: [employer] User Last Read On  Vitor Oseguera 8/24/2021  4:20 PM    No f/u appointment made at this time with PCP.    Routing refill request to provider for review/approval because:  Patient needs to be seen because:    - Due for OV  - Due for PHQ-9    Requested Prescriptions   Pending Prescriptions Disp Refills     sertraline (ZOLOFT) 50 MG tablet [Pharmacy Med Name: SERTRALINE 50MG TABLETS]       Sig: TAKE 1 TABLET(50 MG) BY MOUTH DAILY       SSRIs Protocol Failed - 8/24/2021  4:04 PM        Failed - PHQ-9 score less than 5 in past 6 months     Please review last PHQ-9 score.           Failed - Recent (6 mo) or future (30 days) visit within the authorizing provider's specialty     Patient had office visit in the last 6 months or has a visit in the next 30 days with authorizing provider or within the authorizing provider's specialty.  See \"Patient Info\" tab in inbasket, or \"Choose Columns\" in Meds & Orders section of the refill encounter.            Passed - Medication is active on med list        Passed - Patient is age 18 or older               Janelle MESSER RN, BSN  [facility]        "

## 2021-09-01 ENCOUNTER — HOSPITAL ENCOUNTER (OUTPATIENT)
Dept: BEHAVIORAL HEALTH | Facility: CLINIC | Age: 31
End: 2021-09-01
Attending: FAMILY MEDICINE
Payer: COMMERCIAL

## 2021-09-01 PROCEDURE — 999N000216 HC STATISTIC ADULT CD FACE TO FACE-NO CHRG: Mod: TEL | Performed by: COUNSELOR

## 2021-09-01 ASSESSMENT — COLUMBIA-SUICIDE SEVERITY RATING SCALE - C-SSRS
TOTAL  NUMBER OF ABORTED OR SELF INTERRUPTED ATTEMPTS PAST 3 MONTHS: NO
4. HAVE YOU HAD THESE THOUGHTS AND HAD SOME INTENTION OF ACTING ON THEM?: NO
TOTAL  NUMBER OF ABORTED OR SELF INTERRUPTED ATTEMPTS PAST LIFETIME: NO
4. HAVE YOU HAD THESE THOUGHTS AND HAD SOME INTENTION OF ACTING ON THEM?: NO
3. HAVE YOU BEEN THINKING ABOUT HOW YOU MIGHT KILL YOURSELF?: NO
5. HAVE YOU STARTED TO WORK OUT OR WORKED OUT THE DETAILS OF HOW TO KILL YOURSELF? DO YOU INTEND TO CARRY OUT THIS PLAN?: NO
1. IN THE PAST MONTH, HAVE YOU WISHED YOU WERE DEAD OR WISHED YOU COULD GO TO SLEEP AND NOT WAKE UP?: NO
2. HAVE YOU ACTUALLY HAD ANY THOUGHTS OF KILLING YOURSELF?: NO
ATTEMPT LIFETIME: NO
1. IN THE PAST MONTH, HAVE YOU WISHED YOU WERE DEAD OR WISHED YOU COULD GO TO SLEEP AND NOT WAKE UP?: NO
ATTEMPT PAST THREE MONTHS: NO
TOTAL  NUMBER OF INTERRUPTED ATTEMPTS PAST 3 MONTHS: NO
5. HAVE YOU STARTED TO WORK OUT OR WORKED OUT THE DETAILS OF HOW TO KILL YOURSELF? DO YOU INTEND TO CARRY OUT THIS PLAN?: NO
6. HAVE YOU EVER DONE ANYTHING, STARTED TO DO ANYTHING, OR PREPARED TO DO ANYTHING TO END YOUR LIFE?: NO
6. HAVE YOU EVER DONE ANYTHING, STARTED TO DO ANYTHING, OR PREPARED TO DO ANYTHING TO END YOUR LIFE?: NO
2. HAVE YOU ACTUALLY HAD ANY THOUGHTS OF KILLING YOURSELF LIFETIME?: NO
TOTAL  NUMBER OF INTERRUPTED ATTEMPTS LIFETIME: NO

## 2021-09-01 ASSESSMENT — ANXIETY QUESTIONNAIRES
4. TROUBLE RELAXING: MORE THAN HALF THE DAYS
5. BEING SO RESTLESS THAT IT IS HARD TO SIT STILL: MORE THAN HALF THE DAYS
6. BECOMING EASILY ANNOYED OR IRRITABLE: MORE THAN HALF THE DAYS
7. FEELING AFRAID AS IF SOMETHING AWFUL MIGHT HAPPEN: MORE THAN HALF THE DAYS
1. FEELING NERVOUS, ANXIOUS, OR ON EDGE: MORE THAN HALF THE DAYS
GAD7 TOTAL SCORE: 14
2. NOT BEING ABLE TO STOP OR CONTROL WORRYING: MORE THAN HALF THE DAYS
3. WORRYING TOO MUCH ABOUT DIFFERENT THINGS: MORE THAN HALF THE DAYS

## 2021-09-01 ASSESSMENT — PATIENT HEALTH QUESTIONNAIRE - PHQ9: SUM OF ALL RESPONSES TO PHQ QUESTIONS 1-9: 11

## 2021-09-01 NOTE — PROGRESS NOTES
"[facility]  Provider Name:  Delia Rodriguez, BHUPENDRA, [facility], [facility]      PATIENT'S NAME: Vitor Oseguera  PREFERRED NAME: Vitor  PRONOUNS:       MRN: [id number]  : 1990  ADDRESS: [id number]  [facility] [address]  ACCT. NUMBER:  [id number]  DATE OF SERVICE: 21  START TIME: 1:01pm  END TIME: 1:51pm  PREFERRED PHONE: [phone]   [email or web address] -  Emergency Contact: Heather Oseguera (father) [phone]  May we leave a program related message: Yes    SERVICE MODALITY:  Phone Visit:      Provider verified identity through the following two step process.  Patient provided:  Patient  and Patient address    The patient has been notified of the following:      \"We have found that certain health care needs can be provided without the need for a face to face visit.  This service lets us provide the care you need with a phone conversation.       I will have full access to your [facility] medical record during this entire phone call.   I will be taking notes for your medical record.      Since this is like an office visit, we will bill your insurance company for this service.       There are potential benefits and risks of telephone visits (e.g. limits to patient confidentiality) that differ from in-person visits.?Confidentiality still applies for telephone services, and nobody will record the visit.  It is important to be in a quiet, private space that is free of distractions (including cell phone or other devices) during the visit.??      If during the course of the call I believe a telephone visit is not appropriate, you will not be charged for this service\"     Consent has been obtained for this service by care team member: Yes     UNIVERSAL ADULT Mental Health DIAGNOSTIC ASSESSMENT    Identifying Information:  Patient is a 31 year old. The pronoun use throughout this assessment reflects the patient's chosen pronoun.  Patient was referred " "for an assessment by self. Patient attended the session alone.     Chief Complaint:   The reason for seeking services at this time is: \"Mental health\". \"I would like to discuss options or pathways to start working on the root problems of my mental health. My sertraline Perscription doesn't work as well and instead of upping my dosage on that, id like  information/medications for add/adhd. I beleive majority of my anxiety and depression are caused because of my adhd.\"  The problem(s) began \"05/06/10.\" Patient has attempted to resolve these concerns in the past.  He doesn't remember when he started Sertraline, but probably been on it for 5 years and hasn't upped the dosage. Recently, the pharmacy sstopped refilling his prescription of Sertraline. He wants a refill. He also has depression and anxiety that stems from ADHD. As he has gotten older, it has gotten more difficult for him. He is trying apply himself and it is getting harder. He wants different tools for ADHD, not just increasing depression and anxiety medications. His insurance is off and on and he doesn't have a primary doctor. He wants a doctor and therapy. He thought today's appointment was with a doctor for Sertaline refill and medications. He has not been diagnosed with or assessed for ADHD.       Social/Family History:  Patient reported they grew up in [facility]. They were raised by \"biological parents. Parents were always together.\" He has older brother and sister. Patient reported that their childhood was: He denied all forms of abuse.  Patient described their current relationships with family of origin as: he realizes there are a lot of toxic traits with his parents.     The patient describes their cultural background as \".\"  Cultural influences and impact on patient's life structure, values, norms, and healthcare: \"Pashto family. My siblings and I grew up pretty Americanized. Confucianism background but only my mom practices. I don't " "think I've been influenced by my [place] culture or Tenriism Roman Catholic more than American culture though.\"  Contextual influences on patient's health include: Family Factors. These factors will be addressed in the Preliminary Treatment plan. Patient identified their preferred language to be English. Patient reported they does not need the assistance of an  or other support involved in therapy.      Patient reported had no significant delays in developmental tasks.   Patient's highest education level was \"some college.\"  Patient identified the following learning problems: none diagnosed. In high school, there was a teacher that helped people with sensory issues. He would talk with her and he wanted to be in her class. She thought he had ADHD. But he had to get parents' permission and so he wasn't in the class. Modifications will not be used to assist communication in therapy. Patient reports they are  able to understand written materials.    Patient's current relationship status is partner with significant other for 4 or 5 years.   Patient identified their sexual orientation as \"heterosexual.\"  Patient reported having no child(noble). Patient identified \"partner\" as part of their support system.  Patient identified the quality of these relationships as \"inconsistent.\"    Patient's current living/housing situation involves staying with someone. \"I live at my parents currently but will be moving back in with my girlfriend and her daughter in the next month or two.\"  The immediate members of family and household include his parents and they report that housing is stable; however, his parents' house is stressful and he realizes there are a lot of toxic traits.     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 It is a new business and he recently had a multi-day fight with his friends about the business. Patient reports their finances are obtained through " "unemployment and general assistance. Patient does identify finances as a current stressor.      Patient reported that they have not been involved with the legal system. Patient does not report being under probation/ parole/ jurisdiction. They are not under any current court jurisdiction.     Patient's Strengths and Limitations:  Patient identified the following strengths or resources that will help them succeed in treatment: commitment to health and well being, intelligence, motivation and work ethic. Things that may interfere with the patient's success in treatment include: lack of family support and lack of social support.     Personal and Family Medical History:  Patient does report a family history of mental health concerns.  Patient reports family history includes Cancer in his paternal uncle; Depression in his father; Diabetes in his father; Hypertension in his father and mother; Other Cancer in an other family member..     Patient reported the following previous diagnoses which include(s): \"depression.\" Patient reported symptoms began in childhood.  Patient has not received mental health services in the past:  \"reports no services.\"  Psychiatric Hospitalizations: None. Patient denies a history of civil commitment.  Currently, patient is not receiving other mental health services.        Patient has not had a physical exam to rule out medical causes for current symptoms.  Date of last physical exam was greater than a year ago and client was encouraged to schedule an exam with PCP. The patient has a [facility] Primary Care Provider, who is named Jaymie Blount. Patient reports the following current medical concerns: extreme pain in his stomach. He thinks he might have an ulcer.  Patient reports pain concerns.  Patient does want help addressing pain concerns. There are significant appetite / nutritional concerns / weight changes - he overeats and has accidentally purged when he ate too much. He was overweight and " would overeat before going to bed. He smokes weed and gets munchies. Patient does not report a history of head injury / trauma / cognitive impairment.      Current Outpatient Medications   Medication     sertraline (ZOLOFT) 50 MG tablet     Medication Adherence:  Patient reports taking medications as prescribed. His doctor has said that most people have upped this medication by now.     Patient Allergies:  No Known Allergies    Medical History:    Past Medical History:   Diagnosis Date     Arthritis 2014     Depressive disorder 2017       Current Mental Status Exam:   Appearance:  Unable to assess due to telephone assessment   Eye Contact:  Unable to assess due to telephone assessment   Psychomotor:  Unable to assess due to telephone assessment       Gait / station:  Unable to assess due to telephone assessment   Attitude / Demeanor: Cooperative  Pleasant  Speech      Rate / Production: Normal/ Responsive  Distracted       Volume:  Normal  volume      Language:  intact  Mood:   Normal  Affect:   Unable to assess due to telephone assessment    Thought Content: Clear   Thought Process: Coherent  Logical       Associations: No loosening of associations  Insight:   Fair   Judgment:  Intact   Orientation:  All  Attention/concentration: Easily Distracted - Patient was on the phone and sounded like he was at work. He talked to several others during the assessment. He acknowledged and apologized for the interruptions.     Rating Scales:    PHQ 7/5/2017 1/26/2021 9/1/2021   PHQ-9 Total Score 24 10 11   Q9: Thoughts of better off dead/self-harm past 2 weeks Not at all Not at all Not at all     MARYANNE-7 SCORE 7/5/2017 8/19/2021 9/1/2021   Total Score - 14 (moderate anxiety) -   Total Score 21 14 14     Clinical Global Impressions  First:  Considering your total clinical experience with this particular patient population, how severe are the patient's symptoms at this time?: 5 (9/1/2021  1:30 PM)  Most recent:  Compared to the  "patient's condition at the START of treatment, this patient's condition is: 4 (9/1/2021  1:30 PM)    Substance Use:  Patient did not report a family history of substance use concerns; see medical history section for details.  Patient has not received chemical dependency treatment in the past.  Patient has not ever been to detox. Patient is not currently receiving any chemical dependency treatment.       Substance History of use Age of first use Date of last use     Pattern and duration of use (include amounts and frequency)   Alcohol used in the past   18 07/27/21 He drinks once per month, if at a party and will get tipsy, consuming 4 or 5 beers. He is not concerned about alcohol use.       Cannabis   currently use 20 08/19/21 For the past 10 years, he smokes a few times per day, consuming 5g per week. He doesn't know if he wants to cut back on it because it helps. Then he stated he would like to cut back and quitting weed would feel better than quitting Sertaline, which was terrible when he tried.       Amphetamines   used in the past   05/20/16 He took Ritalin one time in college while doing paperwork. He doesn't like the uppity feeling of the medication. He would like non-stimulant medications. Someone recommended Strattera.    Cocaine/crack    never used          Hallucinogens used in the past   24 05/20/16  Took mushroooms once.    Inhalants never used            Heroin never used            Other Opiates never used        Benzodiazepine   never used        Barbiturates never used        Over the counter meds used in the past 10 08/10/21 Tums, Zantac   Caffeine currently use 5 Last week He was drinking a lot of caffeine - 7 to 8 shots of espresso during the day. He stopped when had the stomach issue in the past week.    Nicotine  used in the past 17 10/10/17    Other substances not listed above: never used          Patient reported the following problems as a result of their substance use: \"no problems, not " "applicable.\"     CAGE-AID Total Score 8/19/2021   Total Score MyChart 0 (A total score of 2 or greater is considered clinically significant)     Substance Use: daily use and cravings/urges to use, increased eating due to marijuana use.     Based on the negative CAGE score and clinical interview there are not indications of drug or alcohol abuse, but patient stated he wants to cut back on marijuana.    Significant Losses / Trauma / Abuse / Neglect Issues:   Patient did not serve in the .  Patient did not indicate or report of significant loss, trauma, abuse or neglect issues.  Concerns for possible neglect are not present.     Safety Assessment:   Current Safety Concerns: Patient denied suicidal ideation, intent, and plan.  He denied past suicide attempts.     [place] Suicide Severity Rating (Short Version) 9/1/2021   Over the past 2 weeks have you felt down, depressed, or hopeless? no   Over the past 2 weeks have you had thoughts of killing yourself? no   Have you ever attempted to kill yourself? no     Patient denies current homicidal ideation and behaviors.  Patient denies current self-injurious ideation and behaviors.    Patient denied risk behaviors associated with substance use.  Patient denies any high risk behaviors associated with mental health symptoms.  Patient reports the following current concerns for their personal safety: None.  Patient reports there are not firearms in the house.      History of Safety Concerns:  Patient denied a history of homicidal ideation.     Patient denied a history of personal safety concerns.    Patient denied a history of assaultive behaviors. It is mostly arguing and bickering with loved ones. He won't give up an argument if he knows he is right, but he won't hit others or hurt them. He will defend himself.   Patient denied a history of sexual assault behaviors.     Patient denied a history of risk behaviors associated with substance use.  Patient denies any history " "of high risk behaviors associated with mental health symptoms.  Patient reports the following protective factors: \"forward or future oriented thinking;dedication to family or friends;safe and stable environment;regular sleep;sense of belonging;purpose;secure attachment;effective problem solving skills;commitment to well being;sense of meaning;healthy fear of risky behaviors or pain;financial stability;strong sense of self worth or esteem.\"    Risk Plan:  See Recommendations for Safety and Risk Management Plan    Review of Symptoms per patient report:  Depression: Change in sleep, Lack of interest, Change in energy level, Difficulties concentrating and Change in appetite - If he doesn't take medication for 3 days, then he gets upset and dwells on things longer. He has a temper. He has low energy because he smokes weed. 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   Julianne:  He stated he is very impulsive and has racing thoughts.   Psychosis: No Symptoms  Anxiety: Excessive worry, Nervousness, Physical complaints, such as headaches, stomachaches, muscle tension, Sleep disturbance, Psychomotor agitation, Ruminations, Poor concentration and Irritability - He avoids situations. He is shy and feels his face blush and tries to hide it. He wakes up feeling anxious. He will be tired, but wake up early and his mind races. He has hectic dreams. He feels stressed, anxious, on edge. He has 4 to 5 days of sleep that are pretty good.   Panic:  No symptoms - He had a big fight with friends/coworkers about the business. It was a multi-day fight. He had to help himself breathe. The company is very new and it's happening more often since it is new.   Post Traumatic Stress Disorder:  No Symptoms   Eating Disorder: Overeating due to marijuana use  ADD / ADHD:  Inattentive, Difficulties listening, Poor task completion, Poor organizational skills, Distractibility, Impulsive, Restlessness/fidgety, Hyperverbal " "and Hyperactive - He starts several tasks at once. It is hard for him learn things if he is inserted in the middle of the process. He forgets things. He feels he is an emotional person - either happy or angry.   Conduct Disorder: No symptoms  Autism Spectrum Disorder: No symptoms  Obsessive Compulsive Disorder: No Symptoms - He washes his hands a lot, but there is a lot of dirty stuff.     Patient reports the following compulsive behaviors and treatment history: None, but stated that his \"vice is food and weed.\"     Diagnostic Criteria:   A. Excessive anxiety and worry about a number of events or activities (such as work or school performance).   B. The person finds it difficult to control the worry.  C. Select 3 or more symptoms (required for diagnosis). Only one item is required in children.   - Restlessness or feeling keyed up or on edge.    - Being easily fatigued.    - Difficulty concentrating or mind going blank.    - Irritability.    - Muscle tension.    - Sleep disturbance (difficulty falling or staying asleep, or restless unsatisfying sleep).   D. The focus of the anxiety and worry is not confined to features of an Axis I disorder.  E. The anxiety, worry, or physical symptoms cause clinically significant distress or impairment in social, occupational, or other important areas of functioning.   F. The disturbance is not due to the direct physiological effects of a substance (e.g., a drug of abuse, a medication) or a general medical condition (e.g., hyperthyroidism) and does not occur exclusively during a Mood Disorder, a Psychotic Disorder, or a Pervasive Developmental Disorder.  A) Recurrent episode(s) - symptoms have been present during the same 2-week period and represent a change from previous functioning 5 or more symptoms (required for diagnosis)   - Depressed mood. Note: In children and adolescents, can be irritable mood.     - Diminished interest or pleasure in all, or almost all, activities.    - " Decreased sleep.    - Psychomotor activity agitation.    - Fatigue or loss of energy.    - Diminished ability to think or concentrate, or indecisiveness.   B) The symptoms cause clinically significant distress or impairment in social, occupational, or other important areas of functioning  C) The episode is not attributable to the physiological effects of a substance or to another medical condition  D) The occurence of major depressive episode is not better explained by other thought / psychotic disorders  E) There has never been a manic episode or hypomanic episode  A) A persistent pattern of inattention and/or hyperactivity-impulsivity that interferes with functioning or development, as characterized by (1) Inattention and/or (2) Hyperactivity and Impulsivity  (1) Inattention: 6 or more of the following symptoms have persisted for at least 6 months to a degree that is inconsistent with developmental level and that negatively impacts directly on social and academic/occupational activities:  - Often fails to give close attention to details or makes careless mistakes in schoolwork, at work, or during other activities  - Often has difficulty sustaining attention in tasks or play activities  - Often does not seem to listen when spoken to directly  - Often does not follow through on instructions and fails to finish schoolwork, chores, or duties in the workplace  - Often has difficulty organizing tasks and activities  - Often avoids, dislikes, or is reluctant to engage in tasks that require sustained mental effort  - Is often easily distractedby extraneous stimuli  - Is often forgetful in daily activities  (2) Hyeractivity and Impulsivity: 6 or more of the following symptoms have persisted for at least 6 months to a degree that is inconsistent with developmental level and that negatively impacts directly on social and academic/occupational activities:  - Often fidgets with or taps hands or feet or squirms in seat  - Often  "leaves seat in situations when remaining seated is expected  - Often runs about or climbs in situationswhere it is inappropriate  - Often unable to play or engage in leisure activities quietly  - Is often \"on the go,\" acting as if \"driven by a motor\"  OP BEH FRANCHESKA CRITERIA: Substance is often taken in larger amounts or over a longer period than was intended.  Met for:  Cannabis, There is persistent desire or unsuccessful efforts to cut down or control use of the substance.  Met for:  Cannabis,  A great deal of time is spent in activities necessary to obtain the substance, use the substance, or recover from its effects.  Met for:  Cannabis, Craving, or a strong desire or urge to use the substance.  Met for:  Cannabis, Use of the substance is continued despite knowledge of having a persistent or recurrent physical or psychological problem that is likely to have been cause or exacerbated by the substance.  Met for:  Cannabis, Tolerance:  either a need for markedly increased amounts of the substance to achieve the desired effect or a markedly diminished effect with continued use of the dame amount of the substance.  Met for:  Cannabis    Functional Status:  Patient reports the following functional impairments: \"academic performance; childcare or parenting; educational activities; health maintenance; home life; management of the household and or completion of tasks; money management; organization; relationship(s); social interactions; work or vocational responsibilities.\"       WHODAS 2.0 Total Score 9/1/2021   Total Score 21     Nonprogrammatic care:  Patient is requesting basic services to address current mental health concerns.    Clinical Summary:  1. Reason for assessment: patient wants refill on Sertraline and possibly other medications/assessments  2. Psychosocial, Cultural and Contextual Factors: stress at work and friendships.   3. Principal DSM5 Diagnoses  (Sustained by DSM5 Criteria Listed Above):   296.31 " (F33.0) Major Depressive Disorder, Recurrent Episode, Mild;  300.02 (F41.1) Generalized Anxiety Disorder    4. Other Diagnoses that is relevant to services:   Substance-Related & Addictive Disorders 304.30 (F12.20) Cannabis Use Disorder Severe    5. Provisional Diagnosis:  Attention-Deficit/Hyperactivity Disorder  314.01 (F90.2) Combined presentation   6. Prognosis: Relieve Acute Symptoms and Maintain Current Status / Prevent Deterioration  7. Likely consequences of symptoms if not treated: Patient may need higher level of care  8. Client strengths include:  employed, intelligent, motivated, open to learning and work history      Recommendations:     1. Plan for Safety and Risk Management:   Recommended that patient call 911 or go to the local ED should there be a change in any of these risk factors.. Report to child / adult protection services was NA.     2. Patient's identified mental health concerns with a cultural influence will be addressed.    3. Initial Treatment will focus on: Anxiety, Attentional Problems, Substance Use.     4. Resources/Service Plan:    services are not indicated.   Modifications to assist communication are not indicated.   Additional disability accommodations are not indicated.      5. Collaboration:  Collaboration / coordination of treatment will be initiated with the following support professionals: Dr. Jaymie Blount     6.  Referrals:   Patient wants a refill on Sertraline and attempted to schedule with his doctor. He also wants ADHD testing, medications, and individual therapy.  discussed sending an email to Dr. Jaymie Blount for Sertraline refills until he meets with the psychiatrist for ADHD/other medications.  scheduled therapy and psychiatry via [facility] and sent patient [facility] net message.     Counseling/Therapy -   Appointment Date: 9/7/2021  Appointment Time: 3:00 pm - 4:00 pm  Location[facility]  Type: Teletherapy  Provider: Lilian Burk  [facility]  [address] [place] [address]  [phone]  [email or web address]        Psychiatry/Medications -   Appointment Date: 9/15/2021  Appointment Time:  2:30 pm - 4:00 pm  Location: [facility]  Provider: Spencer Fermin CNP,PMOSMANP,RN  [address]  [place] [address]  [phone]  [email or web address]       7. FRANCHESKA: Recommendations: Address underlying anxiety, depression, and attention in order to cut back on marijuana use.  Patient reports they are willing to follow these recommendations. Patient does not have a history of opiate use.    8. Records were reviewed at time of assessment. Information in this assessment was obtained from the medical record and provided by patient who is a fair historian. Patient will have open access to their mental health medical record.          Provider Name/ Credentials: BHUPENDRA Mcfarland, MAIDA, [facility]  September 1, 2021

## 2021-09-04 ENCOUNTER — HEALTH MAINTENANCE LETTER (OUTPATIENT)
Age: 31
End: 2021-09-04

## 2021-12-13 ENCOUNTER — VIRTUAL VISIT (OUTPATIENT)
Dept: FAMILY MEDICINE | Facility: CLINIC | Age: 31
End: 2021-12-13

## 2021-12-13 DIAGNOSIS — F32.A ANXIETY AND DEPRESSION: ICD-10-CM

## 2021-12-13 DIAGNOSIS — F41.9 ANXIETY AND DEPRESSION: ICD-10-CM

## 2021-12-13 PROCEDURE — 99213 OFFICE O/P EST LOW 20 MIN: CPT | Mod: 95 | Performed by: PHYSICIAN ASSISTANT

## 2021-12-13 RX ORDER — SERTRALINE HYDROCHLORIDE 100 MG/1
100 TABLET, FILM COATED ORAL DAILY
Qty: 90 TABLET | Refills: 0 | Status: SHIPPED | OUTPATIENT
Start: 2021-12-13

## 2021-12-13 ASSESSMENT — ANXIETY QUESTIONNAIRES
3. WORRYING TOO MUCH ABOUT DIFFERENT THINGS: NEARLY EVERY DAY
2. NOT BEING ABLE TO STOP OR CONTROL WORRYING: NEARLY EVERY DAY
1. FEELING NERVOUS, ANXIOUS, OR ON EDGE: NEARLY EVERY DAY
5. BEING SO RESTLESS THAT IT IS HARD TO SIT STILL: NEARLY EVERY DAY
7. FEELING AFRAID AS IF SOMETHING AWFUL MIGHT HAPPEN: MORE THAN HALF THE DAYS
GAD7 TOTAL SCORE: 19
IF YOU CHECKED OFF ANY PROBLEMS ON THIS QUESTIONNAIRE, HOW DIFFICULT HAVE THESE PROBLEMS MADE IT FOR YOU TO DO YOUR WORK, TAKE CARE OF THINGS AT HOME, OR GET ALONG WITH OTHER PEOPLE: VERY DIFFICULT
6. BECOMING EASILY ANNOYED OR IRRITABLE: NEARLY EVERY DAY

## 2021-12-13 ASSESSMENT — PATIENT HEALTH QUESTIONNAIRE - PHQ9
5. POOR APPETITE OR OVEREATING: MORE THAN HALF THE DAYS
SUM OF ALL RESPONSES TO PHQ QUESTIONS 1-9: 15

## 2021-12-13 NOTE — PROGRESS NOTES
"Vitor is a 31 year old who is being evaluated via a billable telephone visit.      What phone number would you like to be contacted at? [phone]  How would you like to obtain your AVS? MyChart    Assessment & Plan     Anxiety and depression  Patient has plans to work with psychiatrist in January. Will fill 3 months sertraline - suggest he switch all medications to psychiatrist when he is seeing them. He agrees with this. No further questions. I discussed with the patient risks and benefits of the new medication prescribed including potential side effects.  The patient had opportunity to ask questions and is comfortable with and interested in medications as prescribed.   - sertraline (ZOLOFT) 100 MG tablet; Take 1 tablet (100 mg) by mouth daily      BMI:   Estimated body mass index is 34.78 kg/m  as calculated from the following:    Height as of 1/26/21: 1.852 m (6' 0.91\").    Weight as of 1/26/21: 119.3 kg (263 lb).   Weight management plan: Discussed healthy diet and exercise guidelines    Depression Screening Follow Up    PHQ 12/13/2021   PHQ-9 Total Score 15   Q9: Thoughts of better off dead/self-harm past 2 weeks Not at all   Some encounter information is confidential and restricted. Go to Review Flowsheets activity to see all data.         Follow Up Actions Taken  Referred patient back to current mental health provider.         Return in about 2 months (around 2/13/2022) for Routine preventive.    Lindsay Wilson PA-C  [facility]    Fly Adler is a 31 year old who presents for the following health issues     HPI     Depression and Anxiety Follow-Up    How are you doing with your depression since your last visit? Improved     How are you doing with your anxiety since your last visit?  Worsened     Are you having other symptoms that might be associated with depression or anxiety? Yes:  Waking up during the night, hard time staying asleep    Have you had a significant life event? " OTHER: Starting a company     Do you have any concerns with your use of alcohol or other drugs? Yes:  Marijuana often    Going to therapy - [facility]. Feels it has been giving him a good amount of coping methods.   Feels stable at 100 mg zoloft. Had increased this through a psychiatrist in October - ish?     Social History     Tobacco Use     Smoking status: Former Smoker     Packs/day: 0.00     Years: 5.00     Pack years: 0.00     Smokeless tobacco: Never Used     Tobacco comment: Smoked from [id number] on and off   Substance Use Topics     Alcohol use: No     Comment: rare     Drug use: No     PHQ 7/5/2017 1/26/2021 12/13/2021   PHQ-9 Total Score 24 10 15   Q9: Thoughts of better off dead/self-harm past 2 weeks Not at all Not at all Not at all   Some encounter information is confidential and restricted. Go to Review [email or web address] activity to see all data.     MARYANNE-7 SCORE 7/5/2017 8/19/2021 12/13/2021   Total Score - 14 (moderate anxiety) -   Total Score 21 - 19   Some encounter information is confidential and restricted. Go to Review [email or web address] activity to see all data.     Last PHQ-9 12/13/2021   1.  Little interest or pleasure in doing things 1   2.  Feeling down, depressed, or hopeless 2   3.  Trouble falling or staying asleep, or sleeping too much 2   4.  Feeling tired or having little energy 2   5.  Poor appetite or overeating 1   6.  Feeling bad about yourself 2   7.  Trouble concentrating 3   8.  Moving slowly or restless 2   Q9: Thoughts of better off dead/self-harm past 2 weeks 0   PHQ-9 Total Score 15   Difficulty at work, home, or with people Very difficult   Some encounter information is confidential and restricted. Go to Review [email or web address] activity to see all data.     MARYANNE-7  12/13/2021   1. Feeling nervous, anxious, or on edge 3   2. Not being able to stop or control worrying 3   3. Worrying too much about different things 3   4. Trouble relaxing 2   5. Being so restless that it is hard to sit still  3   6. Becoming easily annoyed or irritable 3   7. Feeling afraid, as if something awful might happen 2   MARYANNE-7 Total Score 19   If you checked any problems, how difficult have they made it for you to do your work, take care of things at home, or get along with other people? Very difficult   Some encounter information is confidential and restricted. Go to Review Flowsheets activity to see all data.       Suicide Assessment Five-step Evaluation and Treatment (SAFE-T)      How many servings of fruits and vegetables do you eat daily?  0-1    On average, how many sweetened beverages do you drink each day (Examples: soda, juice, sweet tea, etc.  Do NOT count diet or artificially sweetened beverages)?   0    How many days per week do you exercise enough to make your heart beat faster? 3 or less    How many minutes a day do you exercise enough to make your heart beat faster? 9 or less  How many days per week do you miss taking your medication? 1    What makes it hard for you to take your medications?  running out of medications, taking half doses to make it last linger        Review of Systems   Constitutional, HEENT, cardiovascular, pulmonary, gi and gu systems are negative, except as otherwise noted.      Objective           Vitals:  No vitals were obtained today due to virtual visit.    Physical Exam   healthy, alert and no distress  PSYCH: Alert and oriented times 3; coherent speech, normal   rate and volume, able to articulate logical thoughts, able   to abstract reason, no tangential thoughts, no hallucinations   or delusions  His affect is normal  RESP: No cough, no audible wheezing, able to talk in full sentences  Remainder of exam unable to be completed due to telephone visits                Phone call duration: 8 minutes

## 2021-12-14 ASSESSMENT — ANXIETY QUESTIONNAIRES: GAD7 TOTAL SCORE: 19

## 2022-04-05 ENCOUNTER — TELEPHONE (OUTPATIENT)
Dept: FAMILY MEDICINE | Facility: CLINIC | Age: 32
End: 2022-04-05

## 2022-04-05 DIAGNOSIS — F32.A ANXIETY AND DEPRESSION: ICD-10-CM

## 2022-04-05 DIAGNOSIS — F41.9 ANXIETY AND DEPRESSION: ICD-10-CM

## 2022-04-07 NOTE — TELEPHONE ENCOUNTER
Per last office visit note patient was going to transfer prescription to psych. Did this happen?  Jaymie Blount PA-C

## 2022-04-07 NOTE — TELEPHONE ENCOUNTER
Called patient. LM asking him to call back to verify if he has transferred this medication to his psych provider     Deborah-

## 2022-04-07 NOTE — TELEPHONE ENCOUNTER
"Routing refill request to provider for review/approval because:  PHQ9 out of range.     PHQ 7/5/2017 1/26/2021 12/13/2021   PHQ-9 Total Score 24 10 15   Q9: Thoughts of better off dead/self-harm past 2 weeks Not at all Not at all Not at all   Some encounter information is confidential and restricted. Go to Review Flowsheets activity to see all data.       Requested Prescriptions   Pending Prescriptions Disp Refills    sertraline (ZOLOFT) 100 MG tablet [Pharmacy Med Name: SERTRALINE 100MG TABLETS] 90 tablet 0     Sig: TAKE 1 TABLET(100 MG) BY MOUTH DAILY        SSRIs Protocol Failed - 4/5/2022  4:09 AM        Failed - PHQ-9 score less than 5 in past 6 months     Please review last PHQ-9 score.           Passed - Medication is active on med list        Passed - Patient is age 18 or older        Passed - Recent (6 mo) or future (30 days) visit within the authorizing provider's specialty     Patient had office visit in the last 6 months or has a visit in the next 30 days with authorizing provider or within the authorizing provider's specialty.  See \"Patient Info\" tab in inbasket, or \"Choose Columns\" in Meds & Orders section of the refill encounter.                  Xin Price RN   [facility]   "

## 2022-04-13 RX ORDER — SERTRALINE HYDROCHLORIDE 100 MG/1
TABLET, FILM COATED ORAL
Qty: 90 TABLET | Refills: 0 | OUTPATIENT
Start: 2022-04-13

## 2022-04-16 ENCOUNTER — HEALTH MAINTENANCE LETTER (OUTPATIENT)
Age: 32
End: 2022-04-16

## 2022-10-16 ENCOUNTER — HEALTH MAINTENANCE LETTER (OUTPATIENT)
Age: 32
End: 2022-10-16

## 2023-06-17 ENCOUNTER — HEALTH MAINTENANCE LETTER (OUTPATIENT)
Age: 33
End: 2023-06-17

## 2023-09-07 ENCOUNTER — TELEPHONE (OUTPATIENT)
Dept: ORTHOPEDICS | Facility: CLINIC | Age: 33
End: 2023-09-07

## 2023-09-07 NOTE — TELEPHONE ENCOUNTER
Advised Vitor to follow up with podiatry or PCP/Family practice for gout, instead of orthopedics.  CHRISTI Sutherland, ATC

## 2024-08-10 ENCOUNTER — HEALTH MAINTENANCE LETTER (OUTPATIENT)
Age: 34
End: 2024-08-10